# Patient Record
Sex: MALE | Race: WHITE | NOT HISPANIC OR LATINO | Employment: OTHER | URBAN - METROPOLITAN AREA
[De-identification: names, ages, dates, MRNs, and addresses within clinical notes are randomized per-mention and may not be internally consistent; named-entity substitution may affect disease eponyms.]

---

## 2023-04-05 PROBLEM — E78.5 HYPERLIPIDEMIA: Status: ACTIVE | Noted: 2023-04-05

## 2023-04-05 PROBLEM — R05.3 CHRONIC COUGH: Status: ACTIVE | Noted: 2023-04-05

## 2023-04-05 PROBLEM — K40.90 LEFT INGUINAL HERNIA: Status: ACTIVE | Noted: 2023-04-05

## 2023-04-05 PROBLEM — L72.9 SUBCUTANEOUS CYST: Status: ACTIVE | Noted: 2023-04-05

## 2023-04-05 PROBLEM — K62.89 RECTAL DISCOMFORT: Status: ACTIVE | Noted: 2023-04-05

## 2023-04-05 PROBLEM — L98.9 SKIN LESION OF LEFT LOWER EXTREMITY: Status: ACTIVE | Noted: 2023-04-05

## 2023-04-05 PROBLEM — R97.20 HIGH PROSTATE SPECIFIC ANTIGEN (PSA): Status: ACTIVE | Noted: 2023-04-05

## 2023-04-05 PROBLEM — N18.9 CKD (CHRONIC KIDNEY DISEASE): Status: ACTIVE | Noted: 2023-04-05

## 2023-04-05 PROBLEM — R14.0 GASSINESS: Status: ACTIVE | Noted: 2023-04-05

## 2023-04-05 PROBLEM — M10.9 GOUT: Status: ACTIVE | Noted: 2023-04-05

## 2023-04-05 PROBLEM — J32.9 CHRONIC SINUSITIS: Status: ACTIVE | Noted: 2023-04-05

## 2023-04-05 PROBLEM — R21 RASH: Status: ACTIVE | Noted: 2023-04-05

## 2023-04-05 PROBLEM — S39.012A BACK STRAIN: Status: ACTIVE | Noted: 2023-04-05

## 2023-04-05 PROBLEM — J30.9 ALLERGIC RHINITIS: Status: ACTIVE | Noted: 2023-04-05

## 2023-04-05 PROBLEM — I10 ESSENTIAL HYPERTENSION: Status: ACTIVE | Noted: 2023-04-05

## 2023-04-05 PROBLEM — J30.2 SEASONAL ALLERGIES: Status: ACTIVE | Noted: 2023-04-05

## 2023-04-05 PROBLEM — K40.90 RIGHT INGUINAL HERNIA: Status: ACTIVE | Noted: 2023-04-05

## 2023-04-05 PROBLEM — L85.3 DRY SKIN: Status: ACTIVE | Noted: 2023-04-05

## 2023-04-05 PROBLEM — E55.9 VITAMIN D DEFICIENCY: Status: ACTIVE | Noted: 2023-04-05

## 2023-04-05 PROBLEM — M47.819 ARTHRITIS OF SPINE: Status: ACTIVE | Noted: 2023-04-05

## 2023-04-05 RX ORDER — FLUTICASONE PROPIONATE 50 MCG
1 SPRAY, SUSPENSION (ML) NASAL 2 TIMES DAILY
COMMUNITY
Start: 2017-08-30

## 2023-04-05 RX ORDER — ALLOPURINOL 300 MG/1
1 TABLET ORAL DAILY
COMMUNITY
Start: 2016-11-15 | End: 2023-07-14

## 2023-04-05 RX ORDER — CETIRIZINE HYDROCHLORIDE 10 MG/1
1 TABLET ORAL DAILY
COMMUNITY
Start: 2020-01-17

## 2023-04-05 RX ORDER — TRIAMCINOLONE ACETONIDE 1 MG/G
CREAM TOPICAL
COMMUNITY

## 2023-04-05 RX ORDER — NAPROXEN SODIUM 220 MG
TABLET ORAL EVERY 12 HOURS
COMMUNITY
Start: 2017-07-12

## 2023-04-05 RX ORDER — HALOBETASOL PROPIONATE 0.5 MG/G
CREAM TOPICAL
COMMUNITY

## 2023-04-05 RX ORDER — RAMIPRIL 10 MG/1
1 CAPSULE ORAL DAILY
COMMUNITY
Start: 2017-09-22 | End: 2023-07-14

## 2023-04-05 RX ORDER — SIMVASTATIN 20 MG/1
1 TABLET, FILM COATED ORAL DAILY
COMMUNITY
Start: 2016-10-24 | End: 2023-07-14

## 2023-04-05 RX ORDER — ALBUTEROL SULFATE 90 UG/1
AEROSOL, METERED RESPIRATORY (INHALATION)
COMMUNITY
Start: 2019-12-08 | End: 2023-04-11 | Stop reason: ALTCHOICE

## 2023-04-07 LAB
APPEARANCE, URINE: CLEAR
BILIRUBIN, URINE: NEGATIVE
BLOOD, URINE: ABNORMAL
COLOR, URINE: YELLOW
GLUCOSE, URINE: NEGATIVE MG/DL
KETONES, URINE: NEGATIVE MG/DL
LEUKOCYTE ESTERASE, URINE: ABNORMAL
MUCUS, URINE: NORMAL /LPF
NITRITE, URINE: NEGATIVE
PH, URINE: 5 (ref 5–8)
PROTEIN, URINE: NEGATIVE MG/DL
RBC, URINE: 1 /HPF (ref 0–5)
SPECIFIC GRAVITY, URINE: 1.01 (ref 1–1.03)
UROBILINOGEN, URINE: <2 MG/DL (ref 0–1.9)
WBC, URINE: 2 /HPF (ref 0–5)

## 2023-04-08 LAB — URINE CULTURE: NORMAL

## 2023-04-11 ENCOUNTER — OFFICE VISIT (OUTPATIENT)
Dept: PRIMARY CARE | Facility: CLINIC | Age: 88
End: 2023-04-11
Payer: MEDICARE

## 2023-04-11 VITALS
DIASTOLIC BLOOD PRESSURE: 64 MMHG | WEIGHT: 194 LBS | HEIGHT: 73 IN | BODY MASS INDEX: 25.71 KG/M2 | SYSTOLIC BLOOD PRESSURE: 140 MMHG

## 2023-04-11 DIAGNOSIS — I10 ESSENTIAL HYPERTENSION: Primary | ICD-10-CM

## 2023-04-11 DIAGNOSIS — K40.90 LEFT INGUINAL HERNIA: ICD-10-CM

## 2023-04-11 DIAGNOSIS — K40.90 RIGHT INGUINAL HERNIA: ICD-10-CM

## 2023-04-11 DIAGNOSIS — R97.20 HIGH PROSTATE SPECIFIC ANTIGEN (PSA): ICD-10-CM

## 2023-04-11 PROCEDURE — 1036F TOBACCO NON-USER: CPT | Performed by: STUDENT IN AN ORGANIZED HEALTH CARE EDUCATION/TRAINING PROGRAM

## 2023-04-11 PROCEDURE — 1159F MED LIST DOCD IN RCRD: CPT | Performed by: STUDENT IN AN ORGANIZED HEALTH CARE EDUCATION/TRAINING PROGRAM

## 2023-04-11 PROCEDURE — 99214 OFFICE O/P EST MOD 30 MIN: CPT | Performed by: STUDENT IN AN ORGANIZED HEALTH CARE EDUCATION/TRAINING PROGRAM

## 2023-04-11 PROCEDURE — 3077F SYST BP >= 140 MM HG: CPT | Performed by: STUDENT IN AN ORGANIZED HEALTH CARE EDUCATION/TRAINING PROGRAM

## 2023-04-11 PROCEDURE — 3078F DIAST BP <80 MM HG: CPT | Performed by: STUDENT IN AN ORGANIZED HEALTH CARE EDUCATION/TRAINING PROGRAM

## 2023-04-11 PROCEDURE — 1160F RVW MEDS BY RX/DR IN RCRD: CPT | Performed by: STUDENT IN AN ORGANIZED HEALTH CARE EDUCATION/TRAINING PROGRAM

## 2023-04-11 RX ORDER — DONEPEZIL HYDROCHLORIDE 5 MG/1
TABLET, FILM COATED ORAL
COMMUNITY
Start: 2023-01-10 | End: 2023-04-11 | Stop reason: ALTCHOICE

## 2023-04-11 ASSESSMENT — ENCOUNTER SYMPTOMS
OCCASIONAL FEELINGS OF UNSTEADINESS: 0
LOSS OF SENSATION IN FEET: 0
HEMATURIA: 1
DEPRESSION: 0

## 2023-04-11 NOTE — PROGRESS NOTES
"Follow up and follow up on urine test    Subjective   Patient ID: Graham Reveles is a 87 y.o. male who presents for Follow-up and Blood in Urine.    Blood in Urine     presents for follow-up.  Accompanied by wife who contributes to history.  Continues to report some issues with cognitive impairment.  Has good long-term memory but having trouble with short-term recall.  Had been started on donepezil.  Had had some blood in the urine and had a recent urinalysis    8 point review of systems is otherwise negative unless mentioned on HPI        Objective   /64   Ht 1.854 m (6' 1\")   Wt 88 kg (194 lb)   BMI 25.60 kg/m²     Physical Exam  General: No acute distress  HEENT: EOMI  CV: Regular rate and rhythm, normal S1 and S2, no murmurs  Pulm: Clear to auscultation bilaterally, no wheezings, rales or rhonchi  Abd: Nondistended  MSK: 5/5 strength in all extremities  Skin: No rashes   Lymphatic: No lymphadenopathy      Assessment/Plan       Cognitive impairment/dementia  -Previous Mini-Mental exam with score of 19 out of 30  -Has been started on donepezil  -Also taking over-the-counter supplement that has been supported of memory improvement  -Had offered referral to neurology in the past which had deferred        Hearing loss  Could be contributing to above, advised audiology eval however patient defers    Allergic rhinitis  -Continue montelukast 10 mg daily  -Continue zyrtec, flonase as needed    Gout  -Controlled, no recent flares   -Continue allopurinol 300 mg daily    Hypertension  -Continue ramipril 10 mg daily    Hyperlipidemia  -Continue simvastatin 20 mg daily    Bilateral inguinal hernias  -Has seen Dr. Jenkins in general surgery, opted for observation at this time    Elevated PSA  -Continue observation     Health maintenance  -Received COVID-19 vaccination    RTC 6 months     This note was dictated by speech recognition. Minor errors in transcription may be present.           "

## 2023-07-11 ENCOUNTER — OFFICE VISIT (OUTPATIENT)
Dept: PRIMARY CARE | Facility: CLINIC | Age: 88
End: 2023-07-11
Payer: MEDICARE

## 2023-07-11 VITALS — SYSTOLIC BLOOD PRESSURE: 122 MMHG | DIASTOLIC BLOOD PRESSURE: 66 MMHG

## 2023-07-11 DIAGNOSIS — H93.8X2 SENSATION OF FULLNESS IN LEFT EAR: Primary | ICD-10-CM

## 2023-07-11 PROCEDURE — 1036F TOBACCO NON-USER: CPT | Performed by: STUDENT IN AN ORGANIZED HEALTH CARE EDUCATION/TRAINING PROGRAM

## 2023-07-11 PROCEDURE — 1160F RVW MEDS BY RX/DR IN RCRD: CPT | Performed by: STUDENT IN AN ORGANIZED HEALTH CARE EDUCATION/TRAINING PROGRAM

## 2023-07-11 PROCEDURE — 3074F SYST BP LT 130 MM HG: CPT | Performed by: STUDENT IN AN ORGANIZED HEALTH CARE EDUCATION/TRAINING PROGRAM

## 2023-07-11 PROCEDURE — 1159F MED LIST DOCD IN RCRD: CPT | Performed by: STUDENT IN AN ORGANIZED HEALTH CARE EDUCATION/TRAINING PROGRAM

## 2023-07-11 PROCEDURE — 99213 OFFICE O/P EST LOW 20 MIN: CPT | Performed by: STUDENT IN AN ORGANIZED HEALTH CARE EDUCATION/TRAINING PROGRAM

## 2023-07-11 PROCEDURE — 3078F DIAST BP <80 MM HG: CPT | Performed by: STUDENT IN AN ORGANIZED HEALTH CARE EDUCATION/TRAINING PROGRAM

## 2023-07-11 NOTE — PROGRESS NOTES
Subjective   Patient ID: Graham Reveles is a 88 y.o. male who presents for Ear Fullness (Ear issues, Had surgery 2 weeks ago)).    HPI   Sick visit. Accompanied by his wife who assists with history.  Left ear has sensation of fullness in the morning, improves throughout the day. No pain. No fever. No drainage.    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 Patient subsequently had cartilage resection around his left hear and repair of left nares performed about 2 weeks ago at [facility]. His wife says he mentioned left ear fullness to his surgeon at [facility] 5 days ago, was told this was OK.  Review of Systems  12-point ROS reviewed and was negative unless otherwise noted in HPI.    Objective   /66     Physical Exam  GEN: conversant, NAD  HEENT: PERRL, EOMI, MMM, TMS clear b/l, ear incision healing well with no erythema or drainage  NECK: supple  CV: S1, S2, RRR  PULM: CTAB  ABD: ND  NEURO: no new gross focal deficits  EXT: no sig LE edema  PSYCH: appropriate affect    Assessment/Plan     Sick visit.  #Left ear fullness: following surgery where cartilage around left ear was excised to repair nasal tissue. No evidence of infection, suspect fullness is related to recent surgery and inflammation. Advised [facility] with plastic surgery. Referral to ENT. He will let us know if symptoms persist or worsen. Available records from [facility] reviewed.     RTC as previously scheduled

## 2023-07-14 DIAGNOSIS — I10 HYPERTENSION, UNSPECIFIED TYPE: ICD-10-CM

## 2023-07-14 DIAGNOSIS — M10.9 GOUT, UNSPECIFIED CAUSE, UNSPECIFIED CHRONICITY, UNSPECIFIED SITE: Primary | ICD-10-CM

## 2023-07-14 DIAGNOSIS — E78.5 DYSLIPIDEMIA: ICD-10-CM

## 2023-07-14 RX ORDER — RAMIPRIL 10 MG/1
CAPSULE ORAL
Qty: 90 CAPSULE | Refills: 0 | Status: SHIPPED | OUTPATIENT
Start: 2023-07-14 | End: 2023-09-20 | Stop reason: ALTCHOICE

## 2023-07-14 RX ORDER — ALLOPURINOL 300 MG/1
TABLET ORAL
Qty: 90 TABLET | Refills: 0 | Status: SHIPPED | OUTPATIENT
Start: 2023-07-14 | End: 2023-09-20 | Stop reason: ALTCHOICE

## 2023-07-14 RX ORDER — SIMVASTATIN 20 MG/1
TABLET, FILM COATED ORAL
Qty: 90 TABLET | Refills: 0 | Status: SHIPPED | OUTPATIENT
Start: 2023-07-14 | End: 2023-10-09

## 2023-09-20 ENCOUNTER — OFFICE VISIT (OUTPATIENT)
Dept: PRIMARY CARE | Facility: CLINIC | Age: 88
End: 2023-09-20
Payer: MEDICARE

## 2023-09-20 VITALS — SYSTOLIC BLOOD PRESSURE: 153 MMHG | BODY MASS INDEX: 24.01 KG/M2 | DIASTOLIC BLOOD PRESSURE: 67 MMHG | WEIGHT: 182 LBS

## 2023-09-20 DIAGNOSIS — Z09 HOSPITAL DISCHARGE FOLLOW-UP: Primary | ICD-10-CM

## 2023-09-20 DIAGNOSIS — I10 HYPERTENSION, UNSPECIFIED TYPE: ICD-10-CM

## 2023-09-20 PROBLEM — Z93.2 ILEOSTOMY PRESENT (MULTI): Status: ACTIVE | Noted: 2023-06-29

## 2023-09-20 PROBLEM — H93.8X2 SENSATION OF PLUGGED EAR ON LEFT SIDE: Status: ACTIVE | Noted: 2023-09-20

## 2023-09-20 PROBLEM — L81.4 OTHER MELANIN HYPERPIGMENTATION: Status: ACTIVE | Noted: 2018-01-25

## 2023-09-20 PROBLEM — C44.719 BASAL CELL CARCINOMA OF SKIN OF LEFT LOWER LIMB, INCLUDING HIP: Status: ACTIVE | Noted: 2018-01-25

## 2023-09-20 PROBLEM — J32.9 SINOBRONCHITIS: Status: ACTIVE | Noted: 2023-09-20

## 2023-09-20 PROBLEM — E78.5 HLD (HYPERLIPIDEMIA): Status: ACTIVE | Noted: 2023-06-29

## 2023-09-20 PROBLEM — J32.9 SINUSITIS: Status: ACTIVE | Noted: 2023-09-20

## 2023-09-20 PROBLEM — H61.22 IMPACTED CERUMEN OF LEFT EAR: Status: ACTIVE | Noted: 2023-09-20

## 2023-09-20 PROBLEM — R41.0 CONFUSION: Status: ACTIVE | Noted: 2023-09-20

## 2023-09-20 PROBLEM — L82.1 OTHER SEBORRHEIC KERATOSIS: Status: ACTIVE | Noted: 2018-01-25

## 2023-09-20 PROBLEM — L57.0 ACTINIC KERATOSIS: Status: ACTIVE | Noted: 2018-01-25

## 2023-09-20 PROBLEM — Z85.828 PERSONAL HISTORY OF OTHER MALIGNANT NEOPLASM OF SKIN: Status: ACTIVE | Noted: 2018-01-25

## 2023-09-20 PROBLEM — F03.A0 MILD DEMENTIA (MULTI): Status: ACTIVE | Noted: 2023-09-20

## 2023-09-20 PROBLEM — H91.90 HEARING LOSS: Status: ACTIVE | Noted: 2023-09-20

## 2023-09-20 PROBLEM — J40 SINOBRONCHITIS: Status: ACTIVE | Noted: 2023-09-20

## 2023-09-20 PROBLEM — D04.71 CARCINOMA IN SITU OF SKIN OF RIGHT LOWER LIMB, INCLUDING HIP: Status: ACTIVE | Noted: 2018-01-25

## 2023-09-20 PROBLEM — D48.5 NEOPLASM OF UNCERTAIN BEHAVIOR OF SKIN: Status: ACTIVE | Noted: 2018-01-25

## 2023-09-20 PROBLEM — K40.30 UNILATERAL INCARCERATED INGUINAL HERNIA: Status: ACTIVE | Noted: 2023-09-20

## 2023-09-20 PROBLEM — K56.609 SMALL BOWEL OBSTRUCTION (MULTI): Status: ACTIVE | Noted: 2023-09-20

## 2023-09-20 PROBLEM — L82.0 INFLAMED SEBORRHEIC KERATOSIS: Status: ACTIVE | Noted: 2018-01-25

## 2023-09-20 PROBLEM — K92.2 GIB (GASTROINTESTINAL BLEEDING): Status: ACTIVE | Noted: 2023-09-20

## 2023-09-20 PROBLEM — L57.9 SKIN CHANGES DUE TO CHRONIC EXPOSURE TO NONIONIZING RADIATION, UNSPECIFIED: Status: ACTIVE | Noted: 2018-01-25

## 2023-09-20 PROBLEM — R31.9 HEMATURIA: Status: ACTIVE | Noted: 2023-09-20

## 2023-09-20 PROCEDURE — 99214 OFFICE O/P EST MOD 30 MIN: CPT | Performed by: STUDENT IN AN ORGANIZED HEALTH CARE EDUCATION/TRAINING PROGRAM

## 2023-09-20 PROCEDURE — 1159F MED LIST DOCD IN RCRD: CPT | Performed by: STUDENT IN AN ORGANIZED HEALTH CARE EDUCATION/TRAINING PROGRAM

## 2023-09-20 PROCEDURE — 1036F TOBACCO NON-USER: CPT | Performed by: STUDENT IN AN ORGANIZED HEALTH CARE EDUCATION/TRAINING PROGRAM

## 2023-09-20 PROCEDURE — 3078F DIAST BP <80 MM HG: CPT | Performed by: STUDENT IN AN ORGANIZED HEALTH CARE EDUCATION/TRAINING PROGRAM

## 2023-09-20 PROCEDURE — 1170F FXNL STATUS ASSESSED: CPT | Performed by: STUDENT IN AN ORGANIZED HEALTH CARE EDUCATION/TRAINING PROGRAM

## 2023-09-20 PROCEDURE — 1160F RVW MEDS BY RX/DR IN RCRD: CPT | Performed by: STUDENT IN AN ORGANIZED HEALTH CARE EDUCATION/TRAINING PROGRAM

## 2023-09-20 PROCEDURE — 3077F SYST BP >= 140 MM HG: CPT | Performed by: STUDENT IN AN ORGANIZED HEALTH CARE EDUCATION/TRAINING PROGRAM

## 2023-09-20 RX ORDER — PANTOPRAZOLE SODIUM 40 MG/1
TABLET, DELAYED RELEASE ORAL
COMMUNITY
Start: 2023-08-22 | End: 2023-09-20 | Stop reason: ALTCHOICE

## 2023-09-20 RX ORDER — GENTAMICIN SULFATE 1 MG/G
OINTMENT TOPICAL 3 TIMES DAILY
COMMUNITY
Start: 2023-06-26

## 2023-09-20 RX ORDER — FLUOROURACIL 50 MG/G
CREAM TOPICAL
COMMUNITY
Start: 2023-04-25 | End: 2023-09-20 | Stop reason: ALTCHOICE

## 2023-09-20 RX ORDER — CEFADROXIL 500 MG/1
CAPSULE ORAL
COMMUNITY
Start: 2023-06-30 | End: 2023-09-20 | Stop reason: ALTCHOICE

## 2023-09-20 RX ORDER — FLUOCINONIDE 0.5 MG/G
OINTMENT TOPICAL
COMMUNITY
Start: 2021-05-19

## 2023-09-20 RX ORDER — RAMIPRIL 1.25 MG/1
5 CAPSULE ORAL
COMMUNITY
Start: 2011-08-24 | End: 2023-09-20 | Stop reason: ALTCHOICE

## 2023-09-20 RX ORDER — SIMVASTATIN 10 MG/1
1 TABLET, FILM COATED ORAL NIGHTLY
COMMUNITY
Start: 2011-08-24

## 2023-09-20 RX ORDER — FLUOROURACIL 20 MG/ML
SOLUTION TOPICAL
COMMUNITY
Start: 2017-08-11 | End: 2023-09-20 | Stop reason: ALTCHOICE

## 2023-09-20 RX ORDER — ALBUTEROL SULFATE 90 UG/1
AEROSOL, METERED RESPIRATORY (INHALATION)
COMMUNITY
Start: 2019-12-08 | End: 2023-09-20 | Stop reason: ALTCHOICE

## 2023-09-20 RX ORDER — OXYCODONE AND ACETAMINOPHEN 5; 325 MG/1; MG/1
1 TABLET ORAL EVERY 8 HOURS PRN
COMMUNITY
Start: 2023-06-30 | End: 2023-09-20 | Stop reason: ALTCHOICE

## 2023-09-20 ASSESSMENT — ACTIVITIES OF DAILY LIVING (ADL)
GROCERY_SHOPPING: INDEPENDENT
BATHING: INDEPENDENT
MANAGING_FINANCES: INDEPENDENT
TAKING_MEDICATION: INDEPENDENT
DRESSING: INDEPENDENT
DOING_HOUSEWORK: INDEPENDENT

## 2023-09-20 ASSESSMENT — PATIENT HEALTH QUESTIONNAIRE - PHQ9
SUM OF ALL RESPONSES TO PHQ9 QUESTIONS 1 AND 2: 0
1. LITTLE INTEREST OR PLEASURE IN DOING THINGS: NOT AT ALL
2. FEELING DOWN, DEPRESSED OR HOPELESS: NOT AT ALL

## 2023-09-20 NOTE — PROGRESS NOTES
Subjective   Reason for Visit: Graham Reveles is an 88 y.o. male here for a Medicare Wellness visit.          Reviewed all medications by prescribing practitioner or clinical pharmacist (such as prescriptions, OTCs, herbal therapies and supplements) and documented in the medical record.    HPI    Recent hospitalization for incarcerated hernia that required surgery.  Also had had some bloody output from his ostomy, these have since resolved.  1 the hospital did not need any his blood pressure medications and has stayed off of them since discharge, home blood pressure readings have been averaging around 120    Has not had any gout flares for a number of years    Patient Care Team:  Rustam Lomas MD as PCP - General  Rustam Lomas MD as PCP - [employer] PCP     Review of Systems    8 point review of systems is otherwise negative unless mentioned on HPI      Objective   Vitals:  /67   Wt 82.6 kg (182 lb)   BMI 24.01 kg/m²       Physical Exam    General: No acute distress  HEENT: EOMI  CV: Regular rate and rhythm, normal S1 and S2, no murmurs  Pulm: Clear to auscultation bilaterally, no wheezings, rales or rhonchi  Abd: Ostomy present  MSK: 5/5 strength in all extremities  Skin: No rashes   Lymphatic: No lymphadenopathy      Assessment/Plan   Problem List Items Addressed This Visit    None    Hospital follow-up  Incarcerated hernia  -Status post surgery and has had followed up with general surgery  -Blood pressure medications had been stopped while in the hospital, at home has taken blood pressure readings that have been normal without blood pressure medication, will plan to discontinue    Cognitive impairment/dementia  -Previous Mini-Mental exam with score of 19 out of 30  -Has been started on donepezil  -Also taking over-the-counter supplement that has been supported of memory improvement  -Had offered referral to neurology in the past which had deferred         Hearing loss  Could be contributing  to above, advised audiology eval however patient defers     Allergic rhinitis  -Continue montelukast 10 mg daily  -Continue zyrtec, flonase as needed     Gout  -Denies any flares for a number of years, would like to discontinue his allopurinol which is reasonable, previously had been on 300 mg daily     Hypertension  -Was previously on ramipril 10 mg daily, in the hospital his blood pressure is normal and has been checking at home which has continued to be normal, will plan to discontinue ramipril     Hyperlipidemia  -Continue simvastatin 20 mg daily     Bilateral inguinal hernias  -Has seen Dr. Jenkins in general surgery, opted for observation at this time     Elevated PSA  -Continue observation      Health maintenance  -Received COVID-19 vaccination     RTC 6 months      This note was dictated by speech recognition. Minor errors in transcription may be present.

## 2023-10-09 DIAGNOSIS — E78.5 DYSLIPIDEMIA: ICD-10-CM

## 2023-10-09 RX ORDER — SIMVASTATIN 20 MG/1
TABLET, FILM COATED ORAL
Qty: 90 TABLET | Refills: 0 | Status: SHIPPED | OUTPATIENT
Start: 2023-10-09 | End: 2024-01-08

## 2024-01-08 DIAGNOSIS — E78.5 DYSLIPIDEMIA: ICD-10-CM

## 2024-01-08 RX ORDER — SIMVASTATIN 20 MG/1
TABLET, FILM COATED ORAL
Qty: 90 TABLET | Refills: 0 | Status: SHIPPED | OUTPATIENT
Start: 2024-01-08 | End: 2024-04-08

## 2024-04-08 DIAGNOSIS — E78.5 DYSLIPIDEMIA: ICD-10-CM

## 2024-04-08 RX ORDER — SIMVASTATIN 20 MG/1
TABLET, FILM COATED ORAL
Qty: 90 TABLET | Refills: 0 | Status: SHIPPED | OUTPATIENT
Start: 2024-04-08

## 2024-04-24 ENCOUNTER — TELEPHONE (OUTPATIENT)
Dept: PRIMARY CARE | Facility: CLINIC | Age: 89
End: 2024-04-24
Payer: MEDICARE

## 2024-05-22 ENCOUNTER — OFFICE VISIT (OUTPATIENT)
Dept: PRIMARY CARE | Facility: CLINIC | Age: 89
End: 2024-05-22
Payer: MEDICARE

## 2024-05-22 VITALS — BODY MASS INDEX: 24.67 KG/M2 | DIASTOLIC BLOOD PRESSURE: 80 MMHG | SYSTOLIC BLOOD PRESSURE: 120 MMHG | WEIGHT: 187 LBS

## 2024-05-22 DIAGNOSIS — Z93.2 ILEOSTOMY PRESENT (MULTI): ICD-10-CM

## 2024-05-22 DIAGNOSIS — K51.20 ULCERATIVE PROCTITIS WITHOUT COMPLICATION (MULTI): Primary | ICD-10-CM

## 2024-05-22 DIAGNOSIS — F03.A0 MILD DEMENTIA WITHOUT BEHAVIORAL DISTURBANCE, PSYCHOTIC DISTURBANCE, MOOD DISTURBANCE, OR ANXIETY, UNSPECIFIED DEMENTIA TYPE (MULTI): ICD-10-CM

## 2024-05-22 PROCEDURE — 1158F ADVNC CARE PLAN TLK DOCD: CPT | Performed by: STUDENT IN AN ORGANIZED HEALTH CARE EDUCATION/TRAINING PROGRAM

## 2024-05-22 PROCEDURE — 1036F TOBACCO NON-USER: CPT | Performed by: STUDENT IN AN ORGANIZED HEALTH CARE EDUCATION/TRAINING PROGRAM

## 2024-05-22 PROCEDURE — 99213 OFFICE O/P EST LOW 20 MIN: CPT | Performed by: STUDENT IN AN ORGANIZED HEALTH CARE EDUCATION/TRAINING PROGRAM

## 2024-05-22 PROCEDURE — 1123F ACP DISCUSS/DSCN MKR DOCD: CPT | Performed by: STUDENT IN AN ORGANIZED HEALTH CARE EDUCATION/TRAINING PROGRAM

## 2024-05-22 PROCEDURE — 3079F DIAST BP 80-89 MM HG: CPT | Performed by: STUDENT IN AN ORGANIZED HEALTH CARE EDUCATION/TRAINING PROGRAM

## 2024-05-22 PROCEDURE — 1159F MED LIST DOCD IN RCRD: CPT | Performed by: STUDENT IN AN ORGANIZED HEALTH CARE EDUCATION/TRAINING PROGRAM

## 2024-05-22 PROCEDURE — 3074F SYST BP LT 130 MM HG: CPT | Performed by: STUDENT IN AN ORGANIZED HEALTH CARE EDUCATION/TRAINING PROGRAM

## 2024-05-22 PROCEDURE — G2211 COMPLEX E/M VISIT ADD ON: HCPCS | Performed by: STUDENT IN AN ORGANIZED HEALTH CARE EDUCATION/TRAINING PROGRAM

## 2024-05-22 ASSESSMENT — PATIENT HEALTH QUESTIONNAIRE - PHQ9
1. LITTLE INTEREST OR PLEASURE IN DOING THINGS: NOT AT ALL
2. FEELING DOWN, DEPRESSED OR HOPELESS: NOT AT ALL
SUM OF ALL RESPONSES TO PHQ9 QUESTIONS 1 AND 2: 0

## 2024-05-22 NOTE — PROGRESS NOTES
Follow up    Subjective   Patient ID: Graham Reveles is a 88 y.o. male who presents for Follow-up.    HPI     Follow-up.  Reports doing well.  Denies any new questions or concerns.    Review of Systems    8 point review of systems is otherwise negative unless mentioned on HPI      Objective   /80   Wt 84.8 kg (187 lb)   BMI 24.67 kg/m²     Physical Exam    General: No acute distress  HEENT: EOMI  CV: Regular rate and rhythm, normal S1 and S2, no murmurs  Pulm: Clear to auscultation bilaterally, no wheezings, rales or rhonchi  Abd: Nondistended  MSK: 5/5 strength in all extremities  Lymphatic: No lymphadenopathy      Assessment/Plan              Cognitive impairment/dementia  -Previous Mini-Mental exam with score of 19 out of 30  -Has been started on donepezil  -Also taking over-the-counter supplement that has been supported of memory improvement  -Had offered referral to neurology in the past which had deferred         Hearing loss  -Could be contributing to above, advised audiology eval however patient defers     Allergic rhinitis  -Continue montelukast 10 mg daily  -Continue zyrtec, flonase as needed     Gout  -Denies any flares for a number of years, would like to discontinue his allopurinol which is reasonable, previously had been on 300 mg daily     Hypertension  -Was previously on ramipril 10 mg daily, in the hospital his blood pressure is normal and has been checking at home which has continued to be normal, now off ramipril     Hyperlipidemia  -Continue simvastatin 20 mg daily     Elevated PSA  -Continue observation      Health maintenance  -Received COVID-19 vaccination     RTC 6 months      This note was dictated by speech recognition. Minor errors in transcription may be present.

## 2024-07-08 DIAGNOSIS — E78.5 DYSLIPIDEMIA: ICD-10-CM

## 2024-07-10 RX ORDER — SIMVASTATIN 20 MG/1
20 TABLET, FILM COATED ORAL NIGHTLY
Qty: 90 TABLET | Refills: 1 | Status: SHIPPED | OUTPATIENT
Start: 2024-07-10

## 2025-01-03 DIAGNOSIS — E78.5 DYSLIPIDEMIA: ICD-10-CM

## 2025-01-03 RX ORDER — SIMVASTATIN 20 MG/1
20 TABLET, FILM COATED ORAL NIGHTLY
Qty: 30 TABLET | Refills: 0 | Status: SHIPPED | OUTPATIENT
Start: 2025-01-03

## 2025-02-21 ENCOUNTER — OFFICE VISIT (OUTPATIENT)
Dept: PRIMARY CARE | Facility: CLINIC | Age: 89
End: 2025-02-21
Payer: MEDICARE

## 2025-02-21 VITALS
SYSTOLIC BLOOD PRESSURE: 142 MMHG | WEIGHT: 188 LBS | DIASTOLIC BLOOD PRESSURE: 68 MMHG | HEIGHT: 73 IN | BODY MASS INDEX: 24.92 KG/M2

## 2025-02-21 DIAGNOSIS — K51.90 ULCERATIVE COLITIS WITHOUT COMPLICATIONS, UNSPECIFIED LOCATION (MULTI): ICD-10-CM

## 2025-02-21 DIAGNOSIS — Z93.2 ILEOSTOMY STATUS (MULTI): ICD-10-CM

## 2025-02-21 DIAGNOSIS — E78.5 DYSLIPIDEMIA: ICD-10-CM

## 2025-02-21 DIAGNOSIS — F03.A0 MILD DEMENTIA WITHOUT BEHAVIORAL DISTURBANCE, PSYCHOTIC DISTURBANCE, MOOD DISTURBANCE, OR ANXIETY, UNSPECIFIED DEMENTIA TYPE: ICD-10-CM

## 2025-02-21 DIAGNOSIS — Z00.00 ROUTINE GENERAL MEDICAL EXAMINATION AT HEALTH CARE FACILITY: Primary | ICD-10-CM

## 2025-02-21 PROCEDURE — 1159F MED LIST DOCD IN RCRD: CPT | Performed by: STUDENT IN AN ORGANIZED HEALTH CARE EDUCATION/TRAINING PROGRAM

## 2025-02-21 PROCEDURE — G0439 PPPS, SUBSEQ VISIT: HCPCS | Performed by: STUDENT IN AN ORGANIZED HEALTH CARE EDUCATION/TRAINING PROGRAM

## 2025-02-21 PROCEDURE — 1123F ACP DISCUSS/DSCN MKR DOCD: CPT | Performed by: STUDENT IN AN ORGANIZED HEALTH CARE EDUCATION/TRAINING PROGRAM

## 2025-02-21 PROCEDURE — 1036F TOBACCO NON-USER: CPT | Performed by: STUDENT IN AN ORGANIZED HEALTH CARE EDUCATION/TRAINING PROGRAM

## 2025-02-21 PROCEDURE — 3077F SYST BP >= 140 MM HG: CPT | Performed by: STUDENT IN AN ORGANIZED HEALTH CARE EDUCATION/TRAINING PROGRAM

## 2025-02-21 PROCEDURE — 99213 OFFICE O/P EST LOW 20 MIN: CPT | Performed by: STUDENT IN AN ORGANIZED HEALTH CARE EDUCATION/TRAINING PROGRAM

## 2025-02-21 PROCEDURE — 1160F RVW MEDS BY RX/DR IN RCRD: CPT | Performed by: STUDENT IN AN ORGANIZED HEALTH CARE EDUCATION/TRAINING PROGRAM

## 2025-02-21 PROCEDURE — 1170F FXNL STATUS ASSESSED: CPT | Performed by: STUDENT IN AN ORGANIZED HEALTH CARE EDUCATION/TRAINING PROGRAM

## 2025-02-21 PROCEDURE — 3078F DIAST BP <80 MM HG: CPT | Performed by: STUDENT IN AN ORGANIZED HEALTH CARE EDUCATION/TRAINING PROGRAM

## 2025-02-21 PROCEDURE — 1158F ADVNC CARE PLAN TLK DOCD: CPT | Performed by: STUDENT IN AN ORGANIZED HEALTH CARE EDUCATION/TRAINING PROGRAM

## 2025-02-21 RX ORDER — SIMVASTATIN 20 MG/1
20 TABLET, FILM COATED ORAL NIGHTLY
Qty: 90 TABLET | Refills: 1 | Status: SHIPPED | OUTPATIENT
Start: 2025-02-21

## 2025-02-21 ASSESSMENT — ENCOUNTER SYMPTOMS
LOSS OF SENSATION IN FEET: 0
OCCASIONAL FEELINGS OF UNSTEADINESS: 0
DEPRESSION: 0

## 2025-02-21 ASSESSMENT — ACTIVITIES OF DAILY LIVING (ADL)
GROCERY_SHOPPING: INDEPENDENT
BATHING: INDEPENDENT
TAKING_MEDICATION: INDEPENDENT
MANAGING_FINANCES: INDEPENDENT
DOING_HOUSEWORK: INDEPENDENT
DRESSING: INDEPENDENT

## 2025-02-21 NOTE — PROGRESS NOTES
"Subjective   Patient ID: Graham Reveles is a 89 y.o. male who presents for Medicare Annual Wellness Visit Subsequent.    HPI     Presents for follow-up  Wellness visit  Accompanied by wife who contributes to history  Reads news on the computer  Occasionally retells stories or asks the same questions  Denies any new questions or concerns    Review of Systems    8 point review of systems is otherwise negative unless mentioned on HPI      Objective   /68   Ht 1.854 m (6' 1\")   Wt 85.3 kg (188 lb)   BMI 24.80 kg/m²     Physical Exam  Constitutional:       General: He is not in acute distress.  HENT:      Head: Atraumatic.   Eyes:      Extraocular Movements: Extraocular movements intact.   Cardiovascular:      Rate and Rhythm: Normal rate and regular rhythm.      Heart sounds: No murmur heard.  Pulmonary:      Breath sounds: No wheezing.   Musculoskeletal:         General: No swelling.   Skin:     Findings: No rash.   Neurological:      Mental Status: Mental status is at baseline.   Psychiatric:         Mood and Affect: Mood normal.         Assessment/Plan       Cognitive impairment/dementia  -Previous Mini-Mental exam with score of 19 out of 30  -Has been started on donepezil  -Also taking over-the-counter supplement that has been supported of memory improvement  -Had offered referral to neurology in the past which had deferred         Hearing loss  -Could be contributing to above, advised audiology eval however patient defers. He reports that his hearing is better than wife's      Allergic rhinitis  -Continue montelukast 10 mg daily  -Continue zyrtec, flonase as needed     Gout  -Denies any flares for a number of years, had discontinued allopurinol and no issues since       Hypertension  -Was previously on ramipril 10 mg daily, in the hospital his blood pressure is normal and has been checking at home which has continued to be normal, now off ramipril     Hyperlipidemia  -Continue simvastatin 20 mg daily   "   Elevated PSA  -Continue observation      Health maintenance  -Received COVID-19 vaccination     RTC 6 months      This note was dictated by speech recognition. Minor errors in transcription may be present.

## 2025-07-28 DIAGNOSIS — E78.5 DYSLIPIDEMIA: ICD-10-CM

## 2025-07-28 RX ORDER — SIMVASTATIN 20 MG/1
20 TABLET, FILM COATED ORAL NIGHTLY
Qty: 90 TABLET | Refills: 0 | Status: SHIPPED | OUTPATIENT
Start: 2025-07-28

## 2025-08-19 ENCOUNTER — OFFICE VISIT (OUTPATIENT)
Dept: PRIMARY CARE | Facility: CLINIC | Age: 89
End: 2025-08-19
Payer: MEDICARE

## 2025-08-19 VITALS
BODY MASS INDEX: 23.86 KG/M2 | HEART RATE: 89 BPM | OXYGEN SATURATION: 93 % | WEIGHT: 180 LBS | HEIGHT: 73 IN | SYSTOLIC BLOOD PRESSURE: 162 MMHG | DIASTOLIC BLOOD PRESSURE: 67 MMHG

## 2025-08-19 DIAGNOSIS — Z00.00 HEALTHCARE MAINTENANCE: Primary | ICD-10-CM

## 2025-08-19 DIAGNOSIS — E78.5 DYSLIPIDEMIA: ICD-10-CM

## 2025-08-19 PROCEDURE — 99213 OFFICE O/P EST LOW 20 MIN: CPT | Performed by: STUDENT IN AN ORGANIZED HEALTH CARE EDUCATION/TRAINING PROGRAM

## 2025-08-19 PROCEDURE — 3077F SYST BP >= 140 MM HG: CPT | Performed by: STUDENT IN AN ORGANIZED HEALTH CARE EDUCATION/TRAINING PROGRAM

## 2025-08-19 PROCEDURE — 3078F DIAST BP <80 MM HG: CPT | Performed by: STUDENT IN AN ORGANIZED HEALTH CARE EDUCATION/TRAINING PROGRAM

## 2025-08-19 PROCEDURE — G2211 COMPLEX E/M VISIT ADD ON: HCPCS | Performed by: STUDENT IN AN ORGANIZED HEALTH CARE EDUCATION/TRAINING PROGRAM

## 2025-08-19 PROCEDURE — 1160F RVW MEDS BY RX/DR IN RCRD: CPT | Performed by: STUDENT IN AN ORGANIZED HEALTH CARE EDUCATION/TRAINING PROGRAM

## 2025-08-19 PROCEDURE — 1159F MED LIST DOCD IN RCRD: CPT | Performed by: STUDENT IN AN ORGANIZED HEALTH CARE EDUCATION/TRAINING PROGRAM

## 2025-08-19 RX ORDER — SIMVASTATIN 20 MG/1
20 TABLET, FILM COATED ORAL NIGHTLY
Qty: 90 TABLET | Refills: 3 | Status: SHIPPED | OUTPATIENT
Start: 2025-08-19

## 2025-08-19 ASSESSMENT — COLUMBIA-SUICIDE SEVERITY RATING SCALE - C-SSRS
1. IN THE PAST MONTH, HAVE YOU WISHED YOU WERE DEAD OR WISHED YOU COULD GO TO SLEEP AND NOT WAKE UP?: NO
2. HAVE YOU ACTUALLY HAD ANY THOUGHTS OF KILLING YOURSELF?: NO
6. HAVE YOU EVER DONE ANYTHING, STARTED TO DO ANYTHING, OR PREPARED TO DO ANYTHING TO END YOUR LIFE?: NO

## 2025-08-26 ENCOUNTER — APPOINTMENT (OUTPATIENT)
Dept: PRIMARY CARE | Facility: CLINIC | Age: 89
End: 2025-08-26
Payer: MEDICARE